# Patient Record
Sex: FEMALE | Employment: FULL TIME | URBAN - METROPOLITAN AREA
[De-identification: names, ages, dates, MRNs, and addresses within clinical notes are randomized per-mention and may not be internally consistent; named-entity substitution may affect disease eponyms.]

---
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
## 2023-01-03 ENCOUNTER — NURSE TRIAGE (OUTPATIENT)
Dept: FAMILY MEDICINE | Facility: CLINIC | Age: 18
End: 2023-01-03

## 2023-01-03 NOTE — TELEPHONE ENCOUNTER
SITUATION:   Patient is scheduled with  on 01/05/23 for the following.   Patient should be scheduled to see her OB in [place].     **PREGNANT 38WEEKS** Pain in side and middle back, I want to know if I have a rib displacement or fracture after feeling a pop in my rib cage    Please find out what is going on with the patient and how she is doing. Please inform patient that appointment with  was cancelled. R/S appointment with correct provider. (OB or PCP)    RN left message to return call.   RN sent [employer] message below.     Good afternoon Madeline,     I tried reaching out to see how you are feeling? Are you still having side/ribcage pain? I wanted to let you know also that we need to reschedule your appointment with your OB or your primary provider on Thursday Jan.5th, 2023. So your appointment was cancelled. Please either call us at [phone] to speak to a triage nurse or respond back to this message.         Radha Vasquez, NABEELN, RN, PHN  [place] [facility]  January 3, 2023

## 2023-01-03 NOTE — TELEPHONE ENCOUNTER
Patient returned call - delivered baby on 12/19/22.     About 3 days ago she was sitting in the car and felt a pop in her back/rib cage area. She says since this she has a difficult time taking a deep breath. Pain with coughing.   Says the area is tender to the touch. Rating pain as moderate. Has tried ibuprofen and ice, with no relief.     Patient's appointment for 1/5/23 was cancelled. Scheduled patient to be seen tomorrow 1/4/23.    Harriet SYLVESTER, RN  [facility]      Reason for Disposition    MODERATE back pain (e.g., interferes with normal activities) and present > 3 days    Additional Information    Negative: Passed out (i.e., fainted, collapsed and was not responding)    Negative: Shock suspected (e.g., cold/pale/clammy skin, too weak to stand, low BP, rapid pulse)    Negative: Sounds like a life-threatening emergency to the triager    Negative: SEVERE back pain of sudden onset and age > 60 years    Negative: SEVERE abdominal pain (e.g., excruciating)    Negative: Abdominal pain and age > 60 years    Negative: Unable to urinate (or only a few drops) and bladder feels very full    Negative: Loss of bladder or bowel control (urine or bowel incontinence; wetting self, leaking stool) of new-onset    Negative: Numbness (loss of sensation) in groin or rectal area    Negative: Pain radiates into groin, scrotum    Negative: Blood in urine (red, pink, or tea-colored)    Negative: Vomiting and pain over lower ribs of back (i.e., flank - kidney area)    Negative: Weakness of a leg or foot (e.g., unable to bear weight, dragging foot)    Negative: Patient sounds very sick or weak to the triager    Negative: Fever > 100.4 F (38.0 C) and flank pain    Negative: Pain or burning with passing urine (urination)    Negative: SEVERE back pain (e.g., excruciating, unable to do any normal activities) and not improved after pain medicine and CARE ADVICE    Negative: Numbness in an arm or hand  (i.e., loss of sensation) and upper back pain    Negative: Numbness in a leg or foot (i.e., loss of sensation)    Negative: High-risk adult (e.g., history of cancer, history of HIV, or history of IV Drug Use)    Negative: Soft tissue infection (e.g., abscess, cellulitis) or other serious infection (e.g., bacteremia) in last 2 weeks    Negative: Painful rash with multiple small blisters grouped together (i.e., dermatomal distribution or 'band' or 'stripe')    Negative: Pain radiates into the thigh or further down the leg, and in both legs    Negative: Age > 50 and no history of prior similar back pain    Protocols used: BACK PAIN-A-OH

## 2023-01-04 ENCOUNTER — OFFICE VISIT (OUTPATIENT)
Dept: FAMILY MEDICINE | Facility: OTHER | Age: 18
End: 2023-01-04
Payer: COMMERCIAL

## 2023-01-04 VITALS
DIASTOLIC BLOOD PRESSURE: 62 MMHG | WEIGHT: 148.5 LBS | SYSTOLIC BLOOD PRESSURE: 102 MMHG | RESPIRATION RATE: 14 BRPM | TEMPERATURE: 97.4 F | HEIGHT: 60 IN | HEART RATE: 79 BPM | OXYGEN SATURATION: 98 % | BODY MASS INDEX: 29.16 KG/M2

## 2023-01-04 DIAGNOSIS — M99.18 SUBLUXATION COMPLEX (VERTEBRAL) OF RIB CAGE: Primary | ICD-10-CM

## 2023-01-04 PROCEDURE — 90471 IMMUNIZATION ADMIN: CPT | Performed by: FAMILY MEDICINE

## 2023-01-04 PROCEDURE — 99213 OFFICE O/P EST LOW 20 MIN: CPT | Mod: 25 | Performed by: FAMILY MEDICINE

## 2023-01-04 PROCEDURE — 90686 IIV4 VACC NO PRSV 0.5 ML IM: CPT | Performed by: FAMILY MEDICINE

## 2023-01-04 ASSESSMENT — PAIN SCALES - GENERAL: PAINLEVEL: SEVERE PAIN (7)

## 2023-01-04 NOTE — PROGRESS NOTES
Assessment & Plan       ICD-10-CM    1. Subluxation complex (vertebral) of rib cage  M99.18 Physical Therapy Referral        Patient is recently postpartum and so more susceptible to the hormonal changes and subluxation that occurred.  We did reassure her that this will not give her long-term discomfort for most as these changes may likely change back to more stable locations but need to be managed in the meantime.  Manipulative care is commonly used for this though we did for her inform her of some of the exercises that could be done to minimize movement.  Because the core is weak at this time it is likely that she will need manipulative therapy at least some of the time and so we did ask her to consider chiropractor versus physical therapy and she will check on insurance coverage for it intending likely to be at physical therapy for recovery.      16 minutes spent on the date of the encounter doing chart review, history and exam, documentation and further activities per the note       No follow-ups on file.    Annalisa Sheppard MD, MD  [facility] DOUG Correa is a 17 year old accompanied by her son, presenting for the following health issues:  Rib Pain      HPI         Concern - rib/back pain  Onset: 4 days  Description: sharp pain, tender to the touch, left side  Intensity: moderate  Progression of Symptoms:  worsening  Accompanying Signs & Symptoms: none  Previous history of similar problem: no  Precipitating factors:        Worsened by: moving or laying down a certain way  Alleviating factors:        Improved by: not moving   Therapies tried and outcome: cold pack, ibuprofen but doesn't last long to help with the pain      Review of Systems   Constitutional, HEENT, cardiovascular, pulmonary, GI, , musculoskeletal, neuro, skin, endocrine and psych systems are negative, except as otherwise noted.      Objective    /62   Pulse 79   Temp 97.4  F (36.3  C) (Temporal)   Resp 14    Ht 1.524 m (5')   Wt 67.4 kg (148 lb 8 oz)   LMP 03/20/2022   SpO2 98%   Breastfeeding Yes   BMI 29.00 kg/m    Body mass index is 29 kg/m .  Physical Exam   GENERAL: healthy, alert and no distress  RESP: lungs clear to auscultation - no rales, rhonchi or wheezes  CV: regular rate and rhythm, normal S1 S2, no S3 or S4, no murmur, click or rub, no peripheral edema and peripheral pulses strong  MS: Left lowest floating rib pain with pressure and noted some movement.  Improves while lying in her back with stabilization.  SKIN: no suspicious lesions or rashes  NEURO: Normal strength and tone, mentation intact and speech normal  PSYCH: mentation appears normal, affect normal/bright

## 2023-01-09 ENCOUNTER — MEDICAL CORRESPONDENCE (OUTPATIENT)
Dept: PEDIATRICS | Facility: CLINIC | Age: 18
End: 2023-01-09

## 2023-06-04 ENCOUNTER — HEALTH MAINTENANCE LETTER (OUTPATIENT)
Age: 18
End: 2023-06-04

## 2023-10-26 ENCOUNTER — VIRTUAL VISIT (OUTPATIENT)
Dept: FAMILY MEDICINE | Facility: OTHER | Age: 18
End: 2023-10-26
Payer: COMMERCIAL

## 2023-10-26 DIAGNOSIS — J01.90 ACUTE SINUSITIS WITH SYMPTOMS > 10 DAYS: Primary | ICD-10-CM

## 2023-10-26 PROCEDURE — 99213 OFFICE O/P EST LOW 20 MIN: CPT | Mod: 95 | Performed by: PHYSICIAN ASSISTANT

## 2023-10-26 RX ORDER — AZITHROMYCIN 250 MG/1
TABLET, FILM COATED ORAL
Qty: 6 TABLET | Refills: 0 | Status: SHIPPED | OUTPATIENT
Start: 2023-10-26 | End: 2024-06-21

## 2023-10-26 NOTE — PROGRESS NOTES
Madeline is a 18 year old who is being evaluated via a billable video visit.      How would you like to obtain your AVS? MyChart  If the video visit is dropped, the invitation should be resent by: Text to cell phone: [phone]  Will anyone else be joining your video visit? No    Assessment & Plan     ICD-10-CM    1. Acute sinusitis with symptoms > 10 days  J01.90 azithromycin (ZITHROMAX) 250 MG tablet        - Due to severity of symptoms, will treat but did recommend trying to wait 2-3 more days before starting   - Encouraged rest, fluids, can take OTC medications as helpful   - Discussed antibiotic use, duration, and side effects      Review of the result(s) of each unique test - See none     Diagnosis or treatment significantly limited by social determinants of health - None     15 minutes spent on the date of the encounter doing chart review, history and exam, documentation and further activities as noted above    Follow up: JULITO Madrigal [facility]   Madeline is a 18 year old, presenting for the following health issues:  URI      History of Present Illness       Reason for visit:  Cold symptoms  Symptom onset:  1-3 days ago  Symptoms include:  Congestion, sinus headache, coughing ear ache  Symptom intensity:  Mild  Symptom progression:  Worsening  Had these symptoms before:  Yes  Has tried/received treatment for these symptoms:  No  What makes it worse:  No  What makes it better:  No    She eats 0-1 servings of fruits and vegetables daily.She consumes 0 sweetened beverage(s) daily.She exercises with enough effort to increase her heart rate 10 to 19 minutes per day.  She exercises with enough effort to increase her heart rate 4 days per week.   She is taking medications regularly.       Acute Illness  Acute illness concerns: cold symptoms   Onset/Duration: 3-4 days   Symptoms:  Fever: No  Chills/Sweats: No  Headache (location?): YES- frontal    Sinus Pressure: YES- cheek bones hurt, post nasal drip   Conjunctivitis:  No  Ear Pain: YES: bilateral  Rhinorrhea: YES  Congestion: YES  Sore Throat: No  Cough: YES-productive of clear sputum  Wheeze: No  Decreased Appetite: YES  Nausea: No  Vomiting: No  Diarrhea: No  Dysuria/Freq.: No  Dysuria or Hematuria: No  Fatigue/Achiness: No  Sick/Strep Exposure: No  Therapies tried and outcome: Advil for headache -     - Gets seasonal allergies   - No sinus infection   - Doesn't feel like lymph nodes are as swollen as yesterday   - No sinus tenderness   - Did not check for covid now           Review of Systems   Constitutional, HEENT, cardiovascular, pulmonary, gi and gu systems are negative, except as otherwise noted.      Objective           Vitals:  No vitals were obtained today due to virtual visit.    Physical Exam   GENERAL: moderately ill appearing, alert and no distress  EYES: Eyes grossly normal to inspection.  No discharge or erythema, or obvious scleral/conjunctival abnormalities.  RESP: Intermittent wet cough, No audible wheeze or visible cyanosis.  No visible retractions or increased work of breathing.    SKIN: Visible skin clear. No significant rash, abnormal pigmentation or lesions.  NEURO: Cranial nerves grossly intact.  Mentation and speech appropriate for age.  PSYCH: Mentation appears normal, affect normal/bright, judgement and insight intact, normal speech and appearance well-groomed.    Diagnostics: none         Video-Visit Details    Type of service:  Video Visit   Video Start Time: 11:06 AM  Video End Time:11:17 AM  Originating Location (pt. Location): Home  Distant Location (provider location):  Off-site  Platform used for Video Visit: Bernadette

## 2024-04-11 ENCOUNTER — TELEPHONE (OUTPATIENT)
Dept: OBGYN | Facility: CLINIC | Age: 19
End: 2024-04-11

## 2024-04-11 ENCOUNTER — OFFICE VISIT (OUTPATIENT)
Dept: OBGYN | Facility: CLINIC | Age: 19
End: 2024-04-11
Payer: COMMERCIAL

## 2024-04-11 VITALS
DIASTOLIC BLOOD PRESSURE: 74 MMHG | HEART RATE: 87 BPM | SYSTOLIC BLOOD PRESSURE: 119 MMHG | OXYGEN SATURATION: 98 % | BODY MASS INDEX: 27.15 KG/M2 | WEIGHT: 139 LBS

## 2024-04-11 DIAGNOSIS — N89.8 VAGINAL IRRITATION: ICD-10-CM

## 2024-04-11 DIAGNOSIS — R30.0 DYSURIA: Primary | ICD-10-CM

## 2024-04-11 DIAGNOSIS — B37.31 YEAST INFECTION OF THE VAGINA: Primary | ICD-10-CM

## 2024-04-11 LAB
ALBUMIN UR-MCNC: NEGATIVE MG/DL
APPEARANCE UR: CLEAR
BILIRUB UR QL STRIP: NEGATIVE
CLUE CELLS: ABNORMAL
COLOR UR AUTO: YELLOW
GLUCOSE UR STRIP-MCNC: NEGATIVE MG/DL
HGB UR QL STRIP: NEGATIVE
KETONES UR STRIP-MCNC: NEGATIVE MG/DL
LEUKOCYTE ESTERASE UR QL STRIP: ABNORMAL
NITRATE UR QL: NEGATIVE
PH UR STRIP: 7 [PH] (ref 5–7)
RBC #/AREA URNS AUTO: ABNORMAL /HPF
SP GR UR STRIP: 1.01 (ref 1–1.03)
SQUAMOUS #/AREA URNS AUTO: ABNORMAL /LPF
TRICHOMONAS, WET PREP: ABNORMAL
UROBILINOGEN UR STRIP-ACNC: 0.2 E.U./DL
WBC #/AREA URNS AUTO: ABNORMAL /HPF
WBC'S/HIGH POWER FIELD, WET PREP: ABNORMAL
YEAST, WET PREP: PRESENT

## 2024-04-11 PROCEDURE — 87210 SMEAR WET MOUNT SALINE/INK: CPT

## 2024-04-11 PROCEDURE — 99459 PELVIC EXAMINATION: CPT

## 2024-04-11 PROCEDURE — 87491 CHLMYD TRACH DNA AMP PROBE: CPT

## 2024-04-11 PROCEDURE — 81001 URINALYSIS AUTO W/SCOPE: CPT

## 2024-04-11 PROCEDURE — 87591 N.GONORRHOEAE DNA AMP PROB: CPT

## 2024-04-11 PROCEDURE — 99214 OFFICE O/P EST MOD 30 MIN: CPT

## 2024-04-11 RX ORDER — FLUCONAZOLE 150 MG/1
TABLET ORAL
Qty: 2 TABLET | Refills: 0 | Status: SHIPPED | OUTPATIENT
Start: 2024-04-11 | End: 2024-06-21

## 2024-04-11 RX ORDER — CLOBETASOL PROPIONATE 0.5 MG/G
OINTMENT TOPICAL
Qty: 30 G | Refills: 0 | Status: SHIPPED | OUTPATIENT
Start: 2024-04-11 | End: 2024-06-21

## 2024-04-11 RX ORDER — FERROUS SULFATE 325(65) MG
325 TABLET ORAL
COMMUNITY
End: 2024-06-21

## 2024-04-11 NOTE — TELEPHONE ENCOUNTER
"RN called [facility] and relayed sig including that this is to be applied vaginally to swollen area: \"Apply to affected area nightly for 2 weeks and then twice weekly for 2 weeks \"    Pharmacy verified instructions and no further questions at this time.    Aniya Rooney RN on 4/11/2024 at 4:33 PM    "

## 2024-04-11 NOTE — PATIENT INSTRUCTIONS
If you have labs or imaging done, the results will automatically release in [facility] without an interpretation.  Your health care professional will review those results and send an interpretation with recommendations as soon as possible, but this may be 1-3 business days.    If you have any questions regarding your visit, please contact your care team.     [employer] [facility]: [phone]  [facility] CLINIC HOURS TELEPHONE NUMBER   Tabby Guzman, JENNA Kwan-MACK Riggs-MACK Alex-Surgery Scheduler  Krystle-       Monday- [place]  8:00 am-4:00 pm    Tuesday- [place]  8:00 am-4:00 pm    Wednesday- [place] 8:00 am-4:00 pm    Thursday- [place] 8:00 am-4:00 pm    Friday- [place]  8:00 am-4:00 pm [facility]  [address]  [place] [address]  PH: [phone]  Fax: [phone]    Imaging Scheduling all locations  PH: [phone]     [facility]  [address] [place] [address] [address].  [place], [place] [address] [phone]    [facility]  [address] [place] [address]  PH: [phone]     **Surgeries** Our Surgery Schedulers will contact you to schedule. If you do not receive a call within 3 business days, please call [phone].  Urgent Care locations:  [facility]       Monday-Friday   10 am - 8 pm    Saturday and Sunday   9 am - 5 pm   [phone]   If you need a medication refill, please contact your pharmacy. Please allow 3 business days for your refill to be completed.  As always, Thank you for trusting us with your healthcare needs!

## 2024-04-11 NOTE — PROGRESS NOTES
Subjective:  Madeline is a 18 year old   is here today with the following concerns:    Vaginal irritation: 2 episodes of burning, one associated with SIC and one not. She has hx of UTI and reports that now she feels external burning with urination and wiping. She does report some mild abdominal tenderness. No fevers or purulent discharge. She is not on birth control currently and reports regular periods.   She is wondering if there is some cream she can have to help with the swelling and irritation.    ROS: Pertinent ROS as above.    Medical history  OB History    Para Term  AB Living   1 1 1 0 0 1   SAB IAB Ectopic Multiple Live Births   0 0 0 0 1      # Outcome Date GA Lbr Roe/2nd Weight Sex Type Anes PTL Lv   1 Term 20 38w2d 04:00 / 01:14 3.395 kg (7 lb 7.8 oz) M Vag-Spont EPI N VEL      Name: BOUCHRA KIRBY-MADELINE      Apgar1: 9  Apgar5: 9      Obstetric Comments   LEA 2022 based on LMP.  Parenting with Rubio.      Past Medical History:   Diagnosis Date    Known health problems: none       Past Surgical History:   Procedure Laterality Date    NO HISTORY OF SURGERY         ALL/Meds: Her medication and allergy histories were reviewed and are documented in their appropriate chart areas.    SH: Reviewed and documented in the appropriate area of the chart.  FH:  Her family history is reviewed and updated in the chart, today.  PMH: Her past medical, surgical, and obstetric histories were reviewed and updated today in the appropriate chart areas.    Objective:  PE: /74 (BP Location: Left arm, Patient Position: Sitting, Cuff Size: Adult Regular)   Pulse 87   Wt 63 kg (139 lb)   LMP 2024 (Approximate)   SpO2 98%   Breastfeeding No   BMI 27.15 kg/m    Body mass index is 27.15 kg/m .    Pertinent Physical exam findings:    General Appearance:  healthy, alert, active, no distress   Pelvic:       - Ext: Vulva and perineum are normal without lesion, mass or discharge        -  Urethra: normal without discharge or scarring        - Bladder: positive tenderness, no masses. When I pressed on lower abdomen she had pain but reports that she needed to urinate and it was from that.        - Vagina: with white, curd-like, and milky discharge and rugated       - Cervix: normal, multiparous, and neg CMT       - Uterus: Normal shape, position and consistency       - Adnexa: Normal without masses or tenderness    A/P:  Madeline Toney is a 18 year old  here today with the following concerns:  (R30.0) Dysuria  (primary encounter diagnosis)  Plan: Chlamydia trachomatis PCR, Neisseria         gonorrhoeae PCR, UA Macroscopic with reflex to         Microscopic and Culture, Wet preparation, US         Pelvic Complete with Transvaginal          (N89.8) Vaginal irritation  Comment: Discussed temporary trial of clobetasol to decrease swelling. Proper application and tapering off discussed. Informed that vagina looked normal and healthy and I am reassured that she does not have PID due to lack of fevers, purulent discharge, and CMT.   Plan: Chlamydia trachomatis PCR, Neisseria         gonorrhoeae PCR, Wet preparation, US Pelvic         Complete with Transvaginal, clobetasol         (TEMOVATE) 0.05 % external ointment           ESTEFANI Blanco CNP

## 2024-04-11 NOTE — TELEPHONE ENCOUNTER
[facility] Call Center    Phone Message    May a detailed message be left on voicemail: yes     Reason for Call: Medication Question or concern regarding medication   Prescription Clarification  Name of Medication: clobetasol (TEMOVATE)  Prescribing Provider: Megan   Pharmacy: [facility]    What on the order needs clarification? Direction      Action Taken: Message routed to:  Other: [facility]    Travel Screening: Not Applicable

## 2024-04-12 DIAGNOSIS — A74.9 CHLAMYDIA INFECTION: Primary | ICD-10-CM

## 2024-04-12 LAB
C TRACH DNA SPEC QL NAA+PROBE: POSITIVE
N GONORRHOEA DNA SPEC QL NAA+PROBE: NEGATIVE

## 2024-04-12 RX ORDER — DOXYCYCLINE HYCLATE 100 MG
100 TABLET ORAL 2 TIMES DAILY
Qty: 14 TABLET | Refills: 0 | Status: SHIPPED | OUTPATIENT
Start: 2024-04-12 | End: 2024-04-19

## 2024-04-15 ENCOUNTER — HOSPITAL ENCOUNTER (OUTPATIENT)
Dept: ULTRASOUND IMAGING | Facility: CLINIC | Age: 19
Discharge: HOME | End: 2024-04-15
Payer: COMMERCIAL

## 2024-04-15 DIAGNOSIS — N89.8 VAGINAL IRRITATION: ICD-10-CM

## 2024-04-15 DIAGNOSIS — R30.0 DYSURIA: ICD-10-CM

## 2024-04-15 PROCEDURE — 76856 US EXAM PELVIC COMPLETE: CPT

## 2024-04-15 PROCEDURE — 76830 TRANSVAGINAL US NON-OB: CPT

## 2024-04-18 ENCOUNTER — TELEPHONE (OUTPATIENT)
Dept: OBGYN | Facility: CLINIC | Age: 19
End: 2024-04-18

## 2024-04-18 DIAGNOSIS — Z11.3 SCREENING EXAMINATION FOR VENEREAL DISEASE: ICD-10-CM

## 2024-04-18 DIAGNOSIS — Z11.4 SCREENING FOR HUMAN IMMUNODEFICIENCY VIRUS: Primary | ICD-10-CM

## 2024-04-18 NOTE — TELEPHONE ENCOUNTER
Pt saw ESTEFANI Schulte CNP, on 4/11/24 for dysuria and vaginal itching.    Gonorrhea and Chlamydia, UA/UC, wet prep and pelvic US were ordered/performed for pt.    Pt was positive for yeast on wet prep, and positive for chlamydia.    Pt is requesting an HIV blood test.    Routing to Tabby to see if this is something she is willing to order for pt.  It appears her last HIV blood test was done on 5/13/22 and was nonreactive.    Keely Monet RN

## 2024-04-18 NOTE — TELEPHONE ENCOUNTER
[facility] Call Center    Phone Message    May a detailed message be left on voicemail: yes     Reason for Call: Order(s): Other:   Reason for requested: HIV test  Date needed: as soon as possible  Provider name: Pt requested this via [employer]    Action Taken: Other: [facility]  or [facility]    Travel Screening: Not Applicable

## 2024-04-18 NOTE — TELEPHONE ENCOUNTER
[facility] Call Center    Phone Message    May a detailed message be left on voicemail: yes     Reason for Call: Order(s): Other:   Reason for requested: HIV test  Date needed: as soon as possible  Provider name: Pt requested this via [employer]    Action Taken: Other: [facility]  or [facility]    Travel Screening: Not Applicable

## 2024-04-18 NOTE — TELEPHONE ENCOUNTER
Spoke with pt and she does want syphilis testing as well as Hep B testing in addition to the HIV.    Pending all labs and routing to ESTEFANI Schulte CNP, to sign.    Scheduled pt for a lab only appt tomorrow in [place].    Keely Monet RN

## 2024-04-19 ENCOUNTER — LAB (OUTPATIENT)
Dept: LAB | Facility: CLINIC | Age: 19
End: 2024-04-19
Payer: COMMERCIAL

## 2024-04-19 DIAGNOSIS — Z11.3 SCREENING EXAMINATION FOR VENEREAL DISEASE: ICD-10-CM

## 2024-04-19 DIAGNOSIS — Z11.4 SCREENING FOR HUMAN IMMUNODEFICIENCY VIRUS: ICD-10-CM

## 2024-04-19 LAB — T PALLIDUM AB SER QL: NONREACTIVE

## 2024-04-19 PROCEDURE — 87389 HIV-1 AG W/HIV-1&-2 AB AG IA: CPT

## 2024-04-19 PROCEDURE — 36415 COLL VENOUS BLD VENIPUNCTURE: CPT

## 2024-04-19 PROCEDURE — 86780 TREPONEMA PALLIDUM: CPT

## 2024-04-19 PROCEDURE — 87340 HEPATITIS B SURFACE AG IA: CPT

## 2024-04-20 LAB
HBV SURFACE AG SERPL QL IA: NONREACTIVE
HIV 1+2 AB+HIV1 P24 AG SERPL QL IA: NONREACTIVE

## 2024-06-21 ENCOUNTER — OFFICE VISIT (OUTPATIENT)
Dept: FAMILY MEDICINE | Facility: CLINIC | Age: 19
End: 2024-06-21
Payer: COMMERCIAL

## 2024-06-21 VITALS
TEMPERATURE: 97.9 F | OXYGEN SATURATION: 99 % | DIASTOLIC BLOOD PRESSURE: 64 MMHG | SYSTOLIC BLOOD PRESSURE: 101 MMHG | HEART RATE: 73 BPM | BODY MASS INDEX: 25.95 KG/M2 | WEIGHT: 141 LBS | HEIGHT: 62 IN

## 2024-06-21 DIAGNOSIS — Z20.2 EXPOSURE TO STD: Primary | ICD-10-CM

## 2024-06-21 PROCEDURE — 99213 OFFICE O/P EST LOW 20 MIN: CPT

## 2024-06-21 PROCEDURE — 87491 CHLMYD TRACH DNA AMP PROBE: CPT

## 2024-06-21 PROCEDURE — 87591 N.GONORRHOEAE DNA AMP PROB: CPT

## 2024-06-21 ASSESSMENT — PAIN SCALES - GENERAL: PAINLEVEL: NO PAIN (0)

## 2024-06-21 NOTE — PROGRESS NOTES
"Assessment & Plan  1. Exposure to STD  Patient is a 18 year-old female presenting with concerns of possible STD exposure from previous partner back in April 2024. Asymptomatic at this time. Pelvic exam preformed, unable to visualize cervix on pelvic exam. Vaginal walls clear of erythema, lesions, or discharge. Labs pending. Patient understands and is agreeable to plan as discussed in clinic.  - Chlamydia trachomatis/Neisseria gonorrhoeae by PCR - Clinic Collect  - Wet prep - Clinic Collect      Subjective   Madeline is a 18 year old, presenting for the following health issues:  Follow Up and STD  -3 months ago she tested positive for chlamydia and she is here to follow up and retest      6/21/2024    10:53 AM   Additional Questions   Roomed by Douglas CERDA   Accompanied by Ino HAND     History of Present Illness       Reason for visit:  3 month std follow up    She eats 2-3 servings of fruits and vegetables daily.She consumes 0 sweetened beverage(s) daily.She exercises with enough effort to increase her heart rate 9 or less minutes per day.  She exercises with enough effort to increase her heart rate 3 or less days per week.   She is taking medications regularly.     Presents with concerns for possible STD exposure from previous partner back in April 2024. Has not been told that they were positive for STD, would like to be screening. Denies vaginal symptoms (discharge, dyspareunia, and spotting). Denies irritative voiding symptoms including dysuria, frequency, and urgency.        Objective    /64 (BP Location: Left arm, Patient Position: Sitting, Cuff Size: Adult Regular)   Pulse 73   Temp 97.9  F (36.6  C) (Temporal)   Ht 1.565 m (5' 1.61\")   Wt 64 kg (141 lb)   LMP 06/18/2024 (Approximate)   SpO2 99%   BMI 26.11 kg/m    Body mass index is 26.11 kg/m .  Physical Exam  Vitals reviewed.   Constitutional:       General: She is not in acute distress.     Appearance: Normal appearance. She is not ill-appearing. "   HENT:      Head: Normocephalic and atraumatic.   Eyes:      Pupils: Pupils are equal, round, and reactive to light.   Cardiovascular:      Rate and Rhythm: Normal rate and regular rhythm.      Heart sounds: Normal heart sounds, S1 normal and S2 normal. No murmur heard.  Pulmonary:      Effort: Pulmonary effort is normal.      Breath sounds: Normal breath sounds.      Comments: Negative for adventitious breath sounds in all lung fields.  Abdominal:      General: Abdomen is flat. Bowel sounds are normal. There is no distension.      Palpations: Abdomen is soft.      Tenderness: There is abdominal tenderness in the left lower quadrant. There is no right CVA tenderness or left CVA tenderness.   Genitourinary:     Vagina: Normal. No erythema, bleeding or lesions.      Comments: Unable to visualize cervix on pelvic exam. Patient experienced increased and discomfort throughout exam.  Lymphadenopathy:      Cervical: No cervical adenopathy.   Skin:     General: Skin is warm and dry.      Capillary Refill: Capillary refill takes less than 2 seconds.      Comments: No suspicious lesions or rashes.   Neurological:      Mental Status: She is alert.   Psychiatric:         Attention and Perception: Attention and perception normal.         Mood and Affect: Mood and affect normal.      Labs pending      Signed Electronically by: ESTEFANI Baker CNP

## 2024-06-22 LAB
C TRACH DNA SPEC QL PROBE+SIG AMP: POSITIVE
N GONORRHOEA DNA SPEC QL NAA+PROBE: NEGATIVE

## 2024-06-24 ENCOUNTER — TELEPHONE (OUTPATIENT)
Dept: FAMILY MEDICINE | Facility: CLINIC | Age: 19
End: 2024-06-24
Payer: COMMERCIAL

## 2024-06-24 DIAGNOSIS — A74.9 CHLAMYDIA INFECTION: Primary | ICD-10-CM

## 2024-06-24 DIAGNOSIS — Z20.2 EXPOSURE TO STD: ICD-10-CM

## 2024-06-24 DIAGNOSIS — N89.8 VAGINAL IRRITATION: Primary | ICD-10-CM

## 2024-06-24 RX ORDER — CEFTRIAXONE SODIUM 1 G
500 VIAL (EA) INJECTION ONCE
Status: ACTIVE | OUTPATIENT
Start: 2024-06-24

## 2024-06-24 RX ORDER — DOXYCYCLINE 100 MG/1
100 CAPSULE ORAL 2 TIMES DAILY
Qty: 14 CAPSULE | Refills: 0 | Status: SHIPPED | OUTPATIENT
Start: 2024-06-24 | End: 2024-07-01

## 2024-06-24 NOTE — TELEPHONE ENCOUNTER
Danilo Correa,     Chlamydia- test was POSITIVE. Abstain from sex until completion of the 7 day course of antibiotics. It is important your partner be treated and you both abstain from sex during that treatment time period. Please return to re-test for chlamydia in 3 months.     Gonorrhea- test was negative.     I have prescribed a 7 day course of an antibiotic called doxycycline to treat the chlamydia infection. I sent this medication to the [facility] pharmacy. Please avoid taking vitamins/supplements while taking this antibiotic. This antibiotic can make your skin more sensitive to sunlight so please wear sunscreen when outside to prevent sunburn.       We treat for both gonorrhea and chlamydia infections as they commonly occur together. I have ordered for a antibiotic shot that needs to be completed at the clinic. I will have clinic staff reach out to get you scheduled for a nurse only visit to have this completed.     Your current (and past sexual partners) should be made aware of this positive result so they can get treatment.     Take care,  Janell JARA, BENJAMIN Macedo RN  [facility] - Registered Nurse  Clinic Triage [place]   June 24, 2024

## 2024-07-14 ENCOUNTER — HEALTH MAINTENANCE LETTER (OUTPATIENT)
Age: 19
End: 2024-07-14

## 2025-03-25 NOTE — TELEPHONE ENCOUNTER
Mychart results read by patient.  Ebonie Rosenthal RN on 6/24/2024 at 11:03 AM     Spoke to the pt and notified her of the echo result and CG recommendation for repeat testing in 1 year and she is agreeable. Recheck order placed in Epic and central scheduling will contact the pt to schedule a few months prior to the due date.     Called [facility] echo department ([phone]) and left a detailed message for them to call back to see if they can provide the addendum.     Will await call back.

## 2025-07-19 ENCOUNTER — HEALTH MAINTENANCE LETTER (OUTPATIENT)
Age: 20
End: 2025-07-19

## (undated) RX ORDER — BUPIVACAINE HYDROCHLORIDE 2.5 MG/ML
INJECTION, SOLUTION EPIDURAL; INFILTRATION; INTRACAUDAL
Status: DISPENSED
Start: 2024-04-16

## (undated) RX ORDER — EPINEPHRINE 1 MG/ML
INJECTION, SOLUTION INTRAMUSCULAR; SUBCUTANEOUS
Status: DISPENSED
Start: 2024-04-16